# Patient Record
Sex: MALE | Race: WHITE | Employment: PART TIME | URBAN - NONMETROPOLITAN AREA
[De-identification: names, ages, dates, MRNs, and addresses within clinical notes are randomized per-mention and may not be internally consistent; named-entity substitution may affect disease eponyms.]

---

## 2021-06-17 ENCOUNTER — HOSPITAL ENCOUNTER (EMERGENCY)
Age: 21
Discharge: HOME | End: 2021-06-17
Payer: COMMERCIAL

## 2021-06-17 VITALS
HEART RATE: 64 BPM | WEIGHT: 250 LBS | SYSTOLIC BLOOD PRESSURE: 127 MMHG | TEMPERATURE: 99.8 F | OXYGEN SATURATION: 96 % | RESPIRATION RATE: 16 BRPM | BODY MASS INDEX: 35.79 KG/M2 | HEIGHT: 70 IN | DIASTOLIC BLOOD PRESSURE: 65 MMHG

## 2021-06-17 DIAGNOSIS — L08.9 INFECTED BLISTER OF LEFT LOWER EXTREMITY, INITIAL ENCOUNTER: Primary | ICD-10-CM

## 2021-06-17 DIAGNOSIS — S80.822A INFECTED BLISTER OF LEFT LOWER EXTREMITY, INITIAL ENCOUNTER: Primary | ICD-10-CM

## 2021-06-17 PROCEDURE — 6370000000 HC RX 637 (ALT 250 FOR IP): Performed by: NURSE PRACTITIONER

## 2021-06-17 PROCEDURE — 99202 OFFICE O/P NEW SF 15 MIN: CPT | Performed by: NURSE PRACTITIONER

## 2021-06-17 PROCEDURE — 99203 OFFICE O/P NEW LOW 30 MIN: CPT

## 2021-06-17 RX ORDER — DIAPER,BRIEF,INFANT-TODD,DISP
EACH MISCELLANEOUS ONCE
Status: COMPLETED | OUTPATIENT
Start: 2021-06-17 | End: 2021-06-17

## 2021-06-17 RX ORDER — CEPHALEXIN 500 MG/1
500 CAPSULE ORAL 4 TIMES DAILY
Qty: 20 CAPSULE | Refills: 0 | Status: SHIPPED | OUTPATIENT
Start: 2021-06-17 | End: 2021-06-22

## 2021-06-17 RX ADMIN — BACITRACIN ZINC: 500 OINTMENT TOPICAL at 19:59

## 2021-06-17 ASSESSMENT — PAIN DESCRIPTION - ORIENTATION: ORIENTATION: RIGHT;LOWER

## 2021-06-17 ASSESSMENT — PAIN DESCRIPTION - DESCRIPTORS: DESCRIPTORS: DISCOMFORT

## 2021-06-17 ASSESSMENT — PAIN DESCRIPTION - PAIN TYPE: TYPE: ACUTE PAIN

## 2021-06-17 ASSESSMENT — PAIN DESCRIPTION - LOCATION: LOCATION: LEG

## 2021-06-17 ASSESSMENT — PAIN SCALES - GENERAL: PAINLEVEL_OUTOF10: 2

## 2021-06-17 ASSESSMENT — PAIN - FUNCTIONAL ASSESSMENT: PAIN_FUNCTIONAL_ASSESSMENT: ACTIVITIES ARE NOT PREVENTED

## 2021-06-17 NOTE — ED PROVIDER NOTES
[place] [id number]  Urgent Care Encounter      CHIEF COMPLAINT       Chief Complaint   Patient presents with    Abrasion     right lower leg       Nurses Notes reviewed and I agree except as noted in the HPI. HISTORY OFPRESENT ILLNESS   Eusebio Garcia is a 24 y.o. The history is provided by the patient. No  was used. Abscess  Location:  Leg  Leg abscess location:  R lower leg  Size:  2.5 cm  Abscess quality: induration    Abscess quality: not draining, no fluctuance, no itching, not painful, no redness, no warmth and not weeping    Red streaking: yes    Duration:  1 week  Progression:  Worsening  Chronicity:  New  Context: skin injury    Context: not diabetes, not immunosuppression, not injected drug use and not insect bite/sting    Context comment:  Friction blister from pressure, Patrick popped the blister and went swimming at a local pool the wound has gotten worse since  Relieved by:  Nothing  Worsened by:  Nothing  Ineffective treatments: covering. Associated symptoms: no anorexia, no fatigue, no fever, no headaches, no nausea and no vomiting    Risk factors: no family hx of MRSA, no hx of MRSA and no prior abscess        REVIEW OF SYSTEMS     Review of Systems   Constitutional: Negative for activity change, appetite change, chills, diaphoresis, fatigue and fever. Respiratory: Negative for apnea, cough, choking, chest tightness, shortness of breath, wheezing and stridor. Cardiovascular: Negative for chest pain, palpitations and leg swelling. Gastrointestinal: Negative for abdominal pain, anorexia, constipation, diarrhea, nausea and vomiting. Skin: Positive for color change and wound. Neurological: Negative for dizziness, light-headedness and headaches. PAST MEDICAL HISTORY   History reviewed. No pertinent past medical history. SURGICAL HISTORY     Patient  has no past surgical history on file.     CURRENT MEDICATIONS       Discharge Medication List as of 6/17/2021  7:55 PM      CONTINUE these medications which have NOT CHANGED    Details   FLUoxetine HCl (PROZAC PO) Take by mouthHistorical Med             ALLERGIES     Patient is has No Known Allergies. FAMILY HISTORY     Patient's family history is not on file. SOCIAL HISTORY     Patient  reports that he has quit smoking. He has never used smokeless tobacco. He reports current alcohol use. He reports current drug use. Drug: Marijuana. PHYSICAL EXAM     ED TRIAGE VITALS  BP: 127/65, Temp: 99.8 °F (37.7 °C), Pulse: 64, Resp: 16, SpO2: 96 %  Physical Exam  Vitals and nursing note reviewed. Constitutional:       General: He is not in acute distress. Appearance: Normal appearance. He is obese. He is not ill-appearing, toxic-appearing or diaphoretic. HENT:      Head: Normocephalic and atraumatic. Right Ear: External ear normal.      Left Ear: External ear normal.   Eyes:      Extraocular Movements: Extraocular movements intact. Conjunctiva/sclera: Conjunctivae normal.   Pulmonary:      Effort: Pulmonary effort is normal.   Musculoskeletal:         General: Normal range of motion. Cervical back: Normal range of motion. Skin:     General: Skin is warm. Capillary Refill: Capillary refill takes less than 2 seconds. Findings: Erythema and lesion present. Nails: There is no clubbing. Neurological:      General: No focal deficit present. Mental Status: He is alert. Psychiatric:         Mood and Affect: Mood normal.         Behavior: Behavior normal.         Thought Content: Thought content normal.         Judgment: Judgment normal.         DIAGNOSTIC RESULTS   Labs:No results found for this visit on 06/17/21.     IMAGING:  No orders to display     URGENT CARE COURSE:     Vitals:    06/17/21 1949   BP: 127/65   Pulse: 64   Resp: 16   Temp: 99.8 °F (37.7 °C)   SpO2: 96%   Weight: 250 lb (113.4 kg)   Height: 5' 10\" (1.778 m)       Medications   bacitracin zinc

## 2021-06-17 NOTE — ED TRIAGE NOTES
Patient ambulated to room with complaint of abrasion on right lower leg. States he had a blister on back of right lower leg and it popped. States area is now red.

## 2021-06-18 ASSESSMENT — ENCOUNTER SYMPTOMS
CONSTIPATION: 0
COLOR CHANGE: 1
CHEST TIGHTNESS: 0
NAUSEA: 0
APNEA: 0
VOMITING: 0
CHOKING: 0
COUGH: 0
WHEEZING: 0
SHORTNESS OF BREATH: 0
ABDOMINAL PAIN: 0
DIARRHEA: 0
STRIDOR: 0